# Patient Record
Sex: FEMALE | Race: BLACK OR AFRICAN AMERICAN
[De-identification: names, ages, dates, MRNs, and addresses within clinical notes are randomized per-mention and may not be internally consistent; named-entity substitution may affect disease eponyms.]

---

## 2017-11-19 ENCOUNTER — HOSPITAL ENCOUNTER (EMERGENCY)
Dept: HOSPITAL 27 - EMS | Age: 57
LOS: 1 days | Discharge: HOME | End: 2017-11-20
Payer: MEDICARE

## 2017-11-19 VITALS — DIASTOLIC BLOOD PRESSURE: 86 MMHG | SYSTOLIC BLOOD PRESSURE: 138 MMHG

## 2017-11-19 VITALS — BODY MASS INDEX: 41.06 KG/M2 | WEIGHT: 240.5 LBS | HEIGHT: 64 IN

## 2017-11-19 DIAGNOSIS — F41.9: ICD-10-CM

## 2017-11-19 DIAGNOSIS — E78.00: ICD-10-CM

## 2017-11-19 DIAGNOSIS — E11.9: ICD-10-CM

## 2017-11-19 DIAGNOSIS — Z88.8: ICD-10-CM

## 2017-11-19 DIAGNOSIS — I10: Primary | ICD-10-CM

## 2017-11-19 DIAGNOSIS — K21.9: ICD-10-CM

## 2017-11-19 LAB
ALBUMIN SERPL-MCNC: 3.6 G/DL (ref 3.4–5)
ALT SERPL-CCNC: 13 U/L (ref 12–78)
ANION GAP SERPL CALCULATED.3IONS-SCNC: 10 MMOL/L (ref 8–16)
AST SERPL-CCNC: 17 U/L (ref 15–37)
BASOPHILS # BLD AUTO: 0.1 K/UL (ref 0–0.2)
BASOPHILS NFR BLD AUTO: 0.6 % (ref 0–2)
BILIRUB SERPL-MCNC: 0.2 MG/DL (ref 0.1–1)
BUN SERPL-MCNC: 21 MG/DL (ref 7–18)
CALCIUM SERPL-MCNC: 9.8 MG/DL (ref 8.8–10.5)
CHLORIDE SERPL-SCNC: 102 MMOL/L (ref 98–107)
CO2 SERPL-SCNC: 27 MMOL/L (ref 22–29)
CREAT SERPL-MCNC: 1.32 MG/DL (ref 0.6–1.3)
EOSINOPHIL # BLD AUTO: 0.4 K/UL (ref 0–0.7)
EOSINOPHIL NFR BLD AUTO: 3.8 % (ref 1–6)
ERYTHROCYTE [DISTWIDTH] IN BLOOD BY AUTOMATED COUNT: 14.7 % (ref 11.5–14.5)
GFR SERPL CREATININE-BSD FRML MDRD: 50 ML/MIN (ref 60–?)
GLUCOSE BLDC GLUCOMTR-MCNC: 113 MG/DL (ref 70–110)
HCT VFR BLD AUTO: 38.3 % (ref 36–46)
HGB BLD-MCNC: 12.6 G/DL (ref 12–16)
LYMPHOCYTES # BLD AUTO: 4.2 K/UL (ref 1–4.8)
LYMPHOCYTES NFR BLD AUTO: 43.7 % (ref 22–44)
MCH RBC QN AUTO: 26.8 PG (ref 26–34)
MCHC RBC AUTO-ENTMCNC: 32.9 G/DL (ref 31–37)
MCV RBC AUTO: 81 FL (ref 80–100)
MONOCYTES # BLD AUTO: 1 K/UL (ref 0.1–1)
MONOCYTES NFR BLD AUTO: 10.9 % (ref 2–9)
NEUTROPHILS # BLD AUTO: 3.9 K/UL (ref 1.8–7.7)
NEUTROPHILS NFR BLD AUTO: 41 % (ref 40–70)
PLATELET # BLD AUTO: 272 K/UL (ref 150–450)
POTASSIUM SERPL-SCNC: 3.5 MMOL/L (ref 3.5–5.1)
PROT SERPL-MCNC: 8 G/DL (ref 6.4–8.2)
RBC # BLD AUTO: 4.71 MIL/UL (ref 4–5.2)
SODIUM SERPL-SCNC: 139 MMOL/L (ref 136–145)
WBC # BLD AUTO: 9.5 K/UL (ref 4.5–11)

## 2017-11-19 PROCEDURE — 99285 EMERGENCY DEPT VISIT HI MDM: CPT

## 2017-11-19 PROCEDURE — 93005 ELECTROCARDIOGRAM TRACING: CPT

## 2017-11-19 PROCEDURE — 82962 GLUCOSE BLOOD TEST: CPT

## 2018-03-23 DIAGNOSIS — M25.562 LEFT KNEE PAIN, UNSPECIFIED CHRONICITY: Primary | ICD-10-CM

## 2018-03-27 ENCOUNTER — HOSPITAL ENCOUNTER (OUTPATIENT)
Dept: RADIOLOGY | Facility: HOSPITAL | Age: 58
Discharge: HOME | End: 2018-03-27
Attending: ORTHOPAEDIC SURGERY
Payer: MEDICARE

## 2018-03-27 ENCOUNTER — OFFICE VISIT (OUTPATIENT)
Dept: ORTHOPEDICS | Facility: CLINIC | Age: 58
End: 2018-03-27
Payer: MEDICARE

## 2018-03-27 VITALS
SYSTOLIC BLOOD PRESSURE: 129 MMHG | WEIGHT: 229.94 LBS | BODY MASS INDEX: 40.74 KG/M2 | DIASTOLIC BLOOD PRESSURE: 78 MMHG | HEART RATE: 89 BPM | HEIGHT: 63 IN

## 2018-03-27 DIAGNOSIS — G89.29 CHRONIC MIDLINE LOW BACK PAIN WITH LEFT-SIDED SCIATICA: ICD-10-CM

## 2018-03-27 DIAGNOSIS — M25.562 LEFT KNEE PAIN, UNSPECIFIED CHRONICITY: Primary | ICD-10-CM

## 2018-03-27 DIAGNOSIS — M25.562 LEFT KNEE PAIN, UNSPECIFIED CHRONICITY: ICD-10-CM

## 2018-03-27 DIAGNOSIS — M54.42 CHRONIC MIDLINE LOW BACK PAIN WITH LEFT-SIDED SCIATICA: ICD-10-CM

## 2018-03-27 DIAGNOSIS — M47.816 LUMBAR SPONDYLOSIS: ICD-10-CM

## 2018-03-27 PROCEDURE — 73562 X-RAY EXAM OF KNEE 3: CPT | Mod: TC,FY,PO,RT

## 2018-03-27 PROCEDURE — 73564 X-RAY EXAM KNEE 4 OR MORE: CPT | Mod: 26,LT,, | Performed by: RADIOLOGY

## 2018-03-27 PROCEDURE — 73562 X-RAY EXAM OF KNEE 3: CPT | Mod: 26,59,RT, | Performed by: RADIOLOGY

## 2018-03-27 PROCEDURE — 99205 OFFICE O/P NEW HI 60 MIN: CPT | Mod: S$PBB,,, | Performed by: ORTHOPAEDIC SURGERY

## 2018-03-27 PROCEDURE — 99213 OFFICE O/P EST LOW 20 MIN: CPT | Mod: PBBFAC,25,PO | Performed by: ORTHOPAEDIC SURGERY

## 2018-03-27 PROCEDURE — 99999 PR PBB SHADOW E&M-EST. PATIENT-LVL III: CPT | Mod: PBBFAC,,, | Performed by: ORTHOPAEDIC SURGERY

## 2018-03-27 NOTE — PATIENT INSTRUCTIONS
Arthralgia    Arthralgia is the term for pain in or around the joint. It is a symptom, not a disease. This pain may involve one or more joints. In some cases, the pain moves from joint to joint.  There are many causes for joint pain. These include:  · Injury  · Osteoarthritis (wearing out of the joint surface)  · Gout (inflammation of the joint due to crystals in the joint fluid)  · Infection inside the joint    · Bursitis (inflammation of the fluid-filled sacs around the joint)  · Autoimmune disorders such as rheumatoid arthritis or lupus  · Tendonitis (inflammation of chords that attach muscle to bone)  Home care  · Rest the involved joint(s) until your symptoms improve.   · You may be prescribed pain medicine. If none is prescribed, you may use acetaminophen or ibuprofen to control pain and inflammation.  Follow-up care  Follow up with your healthcare provider or as advised.  When to seek medical advice  Contact your healthcare provider right away if any of the following occurs:  · Pain, swelling, or redness of joint increases  · Pain worsens or recurs after a period of improvement  · Pain moves to other joints  · You cannot bear weight on the affected joint   · You cannot move the affected joint  · Joint appears deformed  · New rash appears  · Fever of 100.4ºF (38ºC) or higher, or as directed by your healthcare provider  Date Last Reviewed: 3/1/2017  © 0423-0833 [employer]. [address] [place] [address], [place] [address]. All rights reserved. This information is not intended as a substitute for professional medical care. Always follow your healthcare professional's instructions.

## 2018-03-27 NOTE — PROGRESS NOTES
CC: This is a 57-year-old female that complains of left knee pain.    HPI: The patient states that she underwent a left total knee replacement in [facility].  She states that postoperatively she did well for short.  Then felt a pop and had some swelling in the left knee.  She states that she underwent a bone scan which showed sign of infection but a questionable loosening.  The patient denies any fever or chills.  The patient states that the pain radiates from the knee to the lower extremity as well.    PMH:    Past Medical History:   Diagnosis Date    Breast cancer 2003    Diabetes 11-6-14    Hypertension        PSH:    Past Surgical History:   Procedure Laterality Date    KNEE ARTHROPLASTY Left 2016    KNEE ARTHROSCOPY Left     two knee scopes, date unknown        Family Hx:  History reviewed. No pertinent family history.    Allergy:    Review of patient's allergies indicates:   Allergen Reactions    Lotrel [amlodipine-benazepril] Swelling     Tongue swells     Shellfish containing products Shortness Of Breath and Swelling       Medication:    Current Outpatient Prescriptions:     amlodipine (NORVASC) 10 MG tablet, Take 10 mg by mouth once daily., Disp: , Rfl:     aspirin 81 MG Chew, Take 81 mg by mouth once daily., Disp: , Rfl:     bumetanide (BUMEX) 0.5 MG Tab, Take 1 mg by mouth once daily., Disp: , Rfl:     clonazePAM (KLONOPIN) 1 MG tablet, Take 1 mg by mouth once daily., Disp: , Rfl:     hydrALAZINE (APRESOLINE) 25 MG tablet, Take 25 mg by mouth 2 (two) times daily., Disp: , Rfl:     metformin (GLUCOPHAGE) 500 MG tablet, Take 500 mg by mouth once daily., Disp: , Rfl:     metoprolol succinate (TOPROL-XL) 50 MG 24 hr tablet, Take 50 mg by mouth 2 (two) times daily., Disp: , Rfl:     omeprazole (PRILOSEC) 20 MG capsule, Take 20 mg by mouth once daily., Disp: , Rfl:     potassium chloride (KLOR-CON) 8 MEQ TbSR, Take 8 mEq by mouth once daily., Disp: , Rfl:     pravastatin (PRAVACHOL)  "20 MG tablet, Take 20 mg by mouth once daily., Disp: , Rfl:     valsartan (DIOVAN) 320 MG tablet, Take 320 mg by mouth once daily., Disp: , Rfl:     Social History:    Social History     Social History    Marital status:      Spouse name: N/A    Number of children: N/A    Years of education: N/A     Occupational History    Not on file.     Social History Main Topics    Smoking status: Never Smoker    Smokeless tobacco: Never Used    Alcohol use No    Drug use: No    Sexual activity: Not on file     Other Topics Concern    Not on file     Social History Narrative    No narrative on file       Vitals:   /78   Pulse 89   Ht 5' 3" (1.6 m)   Wt 104.3 kg (229 lb 15 oz)   BMI 40.73 kg/m²      ROS:  GENERAL: No fever, chills, fatigability or weight loss.  SKIN: No rashes, itching or changes in color or texture of skin.  HEAD: No headaches or recent head trauma.  EYES: Visual acuity fine. No photophobia, ocular pain or diplopia.  EARS: Denies ear pain, discharge or vertigo.  NOSE: No loss of smell, no epistaxis or postnasal drip.  MOUTH & THROAT: No hoarseness or change in voice. No excessive gum bleeding.  NODES: Denies swollen glands.  CHEST: Denies GARCIA, cyanosis, wheezing, cough and sputum production.  CARDIOVASCULAR: Denies chest pain, PND, orthopnea or reduced exercise tolerance.  ABDOMEN: Appetite fine. No weight loss. Denies diarrhea, abdominal pain, hematemesis or blood in stool.  URINARY: No flank pain, dysuria or hematuria.  PERIPHERAL VASCULAR: No claudication or cyanosis.  NEUROLOGIC: No history of seizures, paralysis, alteration of gait or coordination.  MUSCULOSKELETAL: See HPI    PE:  APPEARANCE: Well nourished, well developed, in no acute distress.   HEAD: Normocephalic, atraumatic.  EYES: PERRL. EOMI.   EARS: TM's intact. Light reflex normal. No retraction or perforation.   NOSE: Mucosa pink. Airway clear.  MOUTH & THROAT: No tonsillar enlargement. No pharyngeal erythema or " exudate. No stridor.  NECK: Supple.   NODES: No cervical, axillary or inguinal lymph node enlargement.  CHEST: Lungs clear to auscultation.  CARDIOVASCULAR: Normal S1, S2. No rubs, murmurs or gallops.  ABDOMEN: Bowel sounds normal. Not distended. Soft. No tenderness or masses.  NEUROLOGIC: Cranial Nerves: II-XII grossly intact, also see MUSCULOSKELETAL  MUSCULOSKELETAL:        Lumbar spine-decreased range of motion in all planes.  No spasms noted, symmetrical deep tendon reflexes motor strength examination and sensory examination, negative long tract sign.       Left Knee  -dressing intact, 2 plus dorsalis pedis and posterior tibial artery pulses, light touch intact Left lower extremity.  All digits are warm. No erythema, no warmth, no drainage, no swelling, no significant tenderness.  Less than 2 seconds capillary refill all digits.  Range of motion is 0-95°           Assessment:  The x-ray shows the patient's prosthesis is intact.           Diagnosis:              1.  Left knee pain               2.  Lumbar spondylosis    Diagnostic Studies  MRI-No  X-Ray-No  EMG/NCV-No  Arthrogram-No  Bone Scan-No  CT Scan-No  Doppler-No  ESR-Yes  CRP-No  CBC with Diff-No   Rheumatoid/Arthritis Panel-No      Plan:                                                 1. PT-yes                                                 2.OT-no                                          3.NSAID-yes                                        4. Narcotics-no                                     5. Wound care-No                                 6. Rest-yes                                           7. Surgery-no                                         8. JAYCE Hose-no                                    9. Anticoagulation therapy-no               10. Elevation-no                                     11. Crutches-no                                    12. Walker-no             13. Cane no                        14. Referral-yes, to a physiatrist or a spine surgeon.                                      15.Injection-no                            16. Splint   /    Cast   /   Cast Shoe-Yes              17. RICE-none            18. Follow up-

## 2018-03-28 ENCOUNTER — TELEPHONE (OUTPATIENT)
Dept: PAIN MEDICINE | Facility: CLINIC | Age: 58
End: 2018-03-28

## 2018-03-28 NOTE — TELEPHONE ENCOUNTER
----- Message from Leah Curran sent at 3/28/2018 11:49 AM CDT -----  Contact: patient  Calling to reschedule her appointment sooner than 05/06/18. Please call patient @ [phone]. Thanks, fareed

## 2018-04-17 ENCOUNTER — TELEPHONE (OUTPATIENT)
Dept: RADIOLOGY | Facility: HOSPITAL | Age: 58
End: 2018-04-17

## 2018-05-03 ENCOUNTER — OFFICE VISIT (OUTPATIENT)
Dept: ORTHOPEDICS | Facility: CLINIC | Age: 58
End: 2018-05-03
Payer: MEDICARE

## 2018-05-03 ENCOUNTER — HOSPITAL ENCOUNTER (OUTPATIENT)
Dept: RADIOLOGY | Facility: HOSPITAL | Age: 58
Discharge: HOME | End: 2018-05-03
Attending: ORTHOPAEDIC SURGERY
Payer: MEDICARE

## 2018-05-03 VITALS — HEIGHT: 63 IN | BODY MASS INDEX: 44.83 KG/M2 | WEIGHT: 253 LBS

## 2018-05-03 DIAGNOSIS — M47.816 LUMBAR SPONDYLOSIS: ICD-10-CM

## 2018-05-03 DIAGNOSIS — M79.605 LEFT LEG PAIN: ICD-10-CM

## 2018-05-03 DIAGNOSIS — B99.9 INFECTION: Primary | ICD-10-CM

## 2018-05-03 DIAGNOSIS — M79.605 LEFT LEG PAIN: Primary | ICD-10-CM

## 2018-05-03 DIAGNOSIS — M54.5 ACUTE MIDLINE LOW BACK PAIN, WITH SCIATICA PRESENCE UNSPECIFIED: ICD-10-CM

## 2018-05-03 PROCEDURE — 93971 EXTREMITY STUDY: CPT | Mod: 26,,, | Performed by: RADIOLOGY

## 2018-05-03 PROCEDURE — 99215 OFFICE O/P EST HI 40 MIN: CPT | Mod: S$PBB,,, | Performed by: ORTHOPAEDIC SURGERY

## 2018-05-03 PROCEDURE — 93971 EXTREMITY STUDY: CPT | Mod: TC,PO

## 2018-05-03 PROCEDURE — 99213 OFFICE O/P EST LOW 20 MIN: CPT | Mod: PBBFAC,25,PO | Performed by: ORTHOPAEDIC SURGERY

## 2018-05-03 PROCEDURE — 99999 PR PBB SHADOW E&M-EST. PATIENT-LVL III: CPT | Mod: PBBFAC,,, | Performed by: ORTHOPAEDIC SURGERY

## 2018-05-03 NOTE — PROGRESS NOTES
CC: This is a 57-year-old female that complains of left knee pain.    HPI: The patient states that she underwent a left total knee replacement in [facility].  She states that postoperatively she did well for short.  Then felt a pop and had some swelling in the left knee.  She states that she underwent a bone scan which showed sign of infection but a questionable loosening.  The patient denies any fever or chills.  The patient states that the pain radiates from the knee to the lower extremity as well.    PMH:    Past Medical History:   Diagnosis Date    Breast cancer 2003    Diabetes 11-6-14    Hypertension        PSH:    Past Surgical History:   Procedure Laterality Date    KNEE ARTHROPLASTY Left 2016    KNEE ARTHROSCOPY Left     two knee scopes, date unknown        Family Hx:  History reviewed. No pertinent family history.    Allergy:    Review of patient's allergies indicates:   Allergen Reactions    Lotrel [amlodipine-benazepril] Swelling     Tongue swells     Shellfish containing products Shortness Of Breath and Swelling       Medication:    Current Outpatient Prescriptions:     amlodipine (NORVASC) 10 MG tablet, Take 10 mg by mouth once daily., Disp: , Rfl:     aspirin 81 MG Chew, Take 81 mg by mouth once daily., Disp: , Rfl:     bumetanide (BUMEX) 0.5 MG Tab, Take 1 mg by mouth once daily., Disp: , Rfl:     clonazePAM (KLONOPIN) 1 MG tablet, Take 1 mg by mouth once daily., Disp: , Rfl:     hydrALAZINE (APRESOLINE) 25 MG tablet, Take 25 mg by mouth 2 (two) times daily., Disp: , Rfl:     metformin (GLUCOPHAGE) 500 MG tablet, Take 500 mg by mouth once daily., Disp: , Rfl:     metoprolol succinate (TOPROL-XL) 50 MG 24 hr tablet, Take 50 mg by mouth 2 (two) times daily., Disp: , Rfl:     omeprazole (PRILOSEC) 20 MG capsule, Take 20 mg by mouth once daily., Disp: , Rfl:     potassium chloride (KLOR-CON) 8 MEQ TbSR, Take 8 mEq by mouth once daily., Disp: , Rfl:     pravastatin (PRAVACHOL)  "20 MG tablet, Take 20 mg by mouth once daily., Disp: , Rfl:     valsartan (DIOVAN) 320 MG tablet, Take 320 mg by mouth once daily., Disp: , Rfl:     Social History:    Social History     Social History    Marital status:      Spouse name: N/A    Number of children: N/A    Years of education: N/A     Occupational History    Not on file.     Social History Main Topics    Smoking status: Never Smoker    Smokeless tobacco: Never Used    Alcohol use No    Drug use: No    Sexual activity: Not on file     Other Topics Concern    Not on file     Social History Narrative    No narrative on file       Vitals:   Ht 5' 3" (1.6 m)   Wt 114.8 kg (253 lb)   BMI 44.82 kg/m²      ROS:  GENERAL: No fever, chills, fatigability or weight loss.  SKIN: No rashes, itching or changes in color or texture of skin.  HEAD: No headaches or recent head trauma.  EYES: Visual acuity fine. No photophobia, ocular pain or diplopia.  EARS: Denies ear pain, discharge or vertigo.  NOSE: No loss of smell, no epistaxis or postnasal drip.  MOUTH & THROAT: No hoarseness or change in voice. No excessive gum bleeding.  NODES: Denies swollen glands.  CHEST: Denies GARCIA, cyanosis, wheezing, cough and sputum production.  CARDIOVASCULAR: Denies chest pain, PND, orthopnea or reduced exercise tolerance.  ABDOMEN: Appetite fine. No weight loss. Denies diarrhea, abdominal pain, hematemesis or blood in stool.  URINARY: No flank pain, dysuria or hematuria.  PERIPHERAL VASCULAR: No claudication or cyanosis.  NEUROLOGIC: No history of seizures, paralysis, alteration of gait or coordination.  MUSCULOSKELETAL: See HPI    PE:  APPEARANCE: Well nourished, well developed, in no acute distress.   HEAD: Normocephalic, atraumatic.  EYES: PERRL. EOMI.   EARS: TM's intact. Light reflex normal. No retraction or perforation.   NOSE: Mucosa pink. Airway clear.  MOUTH & THROAT: No tonsillar enlargement. No pharyngeal erythema or exudate. No stridor.  NECK: Supple. "   NODES: No cervical, axillary or inguinal lymph node enlargement.  CHEST: Lungs clear to auscultation.  CARDIOVASCULAR: Normal S1, S2. No rubs, murmurs or gallops.  ABDOMEN: Bowel sounds normal. Not distended. Soft. No tenderness or masses.  NEUROLOGIC: Cranial Nerves: II-XII grossly intact, also see MUSCULOSKELETAL  MUSCULOSKELETAL:        Lumbar spine-decreased range of motion in all planes.  No spasms noted, symmetrical deep tendon reflexes motor strength examination and sensory examination, negative long tract sign.       Left Knee  -   2 plus dorsalis pedis and posterior tibial artery pulses, light touch intact Left lower extremity.  All digits are warm. No erythema, no warmth, no drainage, no swelling, no significant tenderness.  Less than 2 seconds capillary refill all digits.  Range of motion is 0-95°           Assessment:  The x-ray shows the patient's prosthesis is intact.           Diagnosis:              1.  Left knee pain               2.  Lumbar spondylosis    Diagnostic Studies  MRI-No  X-Ray-No  EMG/NCV-No  Arthrogram-No  Bone Scan-indium and sulfur colloid scan and ultrasound to rule out DVT  CT Scan-No  Doppler-No  ESR-Yes  CRP-yes  CBC with Diff-yes   Rheumatoid/Arthritis Panel-No      Plan:                                                 1. PT-yes                                                 2.OT-no                                          3.NSAID-yes                                        4. Narcotics-no                                     5. Wound care-No                                 6. Rest-yes                                           7. Surgery-no                                         8. JAYCE Hose-no                                    9. Anticoagulation therapy-no               10. Elevation-no                                     11. Crutches-no                                    12. Walker-no             13. Cane no                        14. Referral-yes, to a physiatrist or a  spine surgeon.                                     15.Injection-no                            16. Splint   /    Cast   /   Cast Shoe-Yes              17. RICE-none            18. Follow up- 3 weeks

## 2018-05-04 PROBLEM — M79.605 LEFT LEG PAIN: Status: ACTIVE | Noted: 2018-05-04

## 2018-05-04 NOTE — PATIENT INSTRUCTIONS
ACE Wrap  Minor muscle or joint injuries are often treated with an elastic bandage. The bandage provides support and compression to the injured area. An elastic bandage is a stretchy, rolled bandage. Elastic bandages range in width from 2 to 6 inches. They can be used for a variety of injuries. The bandages are often called ACE bandages, after the most common brand name.  If used correctly, elastic bandages help control swelling and ease pain. An elastic bandage is also a good reminder not to overuse the injured area. However, elastic bandages do not provide a lot of support and will not prevent reinjury.  Home care    To apply an elastic bandage:  · Check the skin before wrapping the injury. It should be clean, dry, and free of drainage.  · Start wrapping below the injury and work your way toward the body. For an ankle sprain, start wrapping around the foot and work up toward the calf. This will help control swelling.  · Overlap the edges of the bandage so it stays snuggly in place.  · Wrap the bandage firmly, but not too tightly. A tight bandage can increase swelling on either end of the bandage. Make sure the bandage is wrinkle free.  · Leave fingers and toes exposed.  · Secure ends of the bandage (even self-sticking ones) with clips or tape.  · Check frequently to ensure adequate circulation, especially in the fingers and toes. Loosen the bandage if there is local swelling, numbness, tingling, discomfort, coldness, or discoloration (skin pale or bluish in color).  · Rewrap the bandage as needed during the day. Reroll the bandage as you unwind it.  Continue using the elastic bandage until the pain and swelling are gone or as your healthcare provider advises.  If you have been told to ice the area, the ice can be secured in place with the elastic bandage. Wrap the ice pack with a thin towel to protect the skin. Do not put ice or an ice pack directly on the skin.  Ice the area for no more than 20 minutes at a  time.    Follow-up care  Follow up with your healthcare provider, as advised.  When to seek medical advice  Call your healthcare provider for any of the following:  · Pain and swelling that doesn't get better or gets worse  · Trouble moving injured area  · Skin discoloration, numbness, or tingling that doesnt go away after bandage is removed  Date Last Reviewed: 9/13/2015  © 7629-8771 [employer]. [address] [place] [address], [place] [address]. All rights reserved. This information is not intended as a substitute for professional medical care. Always follow your healthcare professional's instructions.

## 2018-05-17 ENCOUNTER — HOSPITAL ENCOUNTER (OUTPATIENT)
Dept: RADIOLOGY | Facility: HOSPITAL | Age: 58
Discharge: HOME | End: 2018-05-17
Attending: ORTHOPAEDIC SURGERY
Payer: MEDICARE

## 2018-05-17 DIAGNOSIS — B99.9 INFECTION: ICD-10-CM

## 2018-05-17 DIAGNOSIS — B99.9 INFECTION: Primary | ICD-10-CM

## 2018-05-17 PROCEDURE — 78805 NM INFLAMMATORY LOCALIZATION LTD CERETEC: CPT | Mod: TC,PO

## 2018-05-17 PROCEDURE — 78805 NM INFLAMMATORY LOCALIZATION LTD CERETEC: CPT | Mod: 26,,, | Performed by: RADIOLOGY

## 2018-06-07 ENCOUNTER — OFFICE VISIT (OUTPATIENT)
Dept: ORTHOPEDICS | Facility: CLINIC | Age: 58
End: 2018-06-07
Payer: MEDICARE

## 2018-06-07 VITALS
BODY MASS INDEX: 44.83 KG/M2 | WEIGHT: 253 LBS | SYSTOLIC BLOOD PRESSURE: 121 MMHG | DIASTOLIC BLOOD PRESSURE: 84 MMHG | HEIGHT: 63 IN | HEART RATE: 79 BPM

## 2018-06-07 DIAGNOSIS — G89.29 CHRONIC MIDLINE LOW BACK PAIN WITH SCIATICA, SCIATICA LATERALITY UNSPECIFIED: ICD-10-CM

## 2018-06-07 DIAGNOSIS — M25.561 RIGHT KNEE PAIN, UNSPECIFIED CHRONICITY: ICD-10-CM

## 2018-06-07 DIAGNOSIS — M25.562 CHRONIC PAIN OF LEFT KNEE: Primary | ICD-10-CM

## 2018-06-07 DIAGNOSIS — G89.29 CHRONIC PAIN OF LEFT KNEE: Primary | ICD-10-CM

## 2018-06-07 DIAGNOSIS — M54.40 CHRONIC MIDLINE LOW BACK PAIN WITH SCIATICA, SCIATICA LATERALITY UNSPECIFIED: ICD-10-CM

## 2018-06-07 PROCEDURE — 99214 OFFICE O/P EST MOD 30 MIN: CPT | Mod: PBBFAC,PO | Performed by: ORTHOPAEDIC SURGERY

## 2018-06-07 PROCEDURE — 99215 OFFICE O/P EST HI 40 MIN: CPT | Mod: S$PBB,,, | Performed by: ORTHOPAEDIC SURGERY

## 2018-06-07 PROCEDURE — 99999 PR PBB SHADOW E&M-EST. PATIENT-LVL IV: CPT | Mod: PBBFAC,,, | Performed by: ORTHOPAEDIC SURGERY

## 2018-06-07 NOTE — PATIENT INSTRUCTIONS
Arthralgia    Arthralgia is the term for pain in or around the joint. It is a symptom, not a disease. This pain may involve one or more joints. In some cases, the pain moves from joint to joint.  There are many causes for joint pain. These include:  · Injury  · Osteoarthritis (wearing out of the joint surface)  · Gout (inflammation of the joint due to crystals in the joint fluid)  · Infection inside the joint    · Bursitis (inflammation of the fluid-filled sacs around the joint)  · Autoimmune disorders such as rheumatoid arthritis or lupus  · Tendonitis (inflammation of chords that attach muscle to bone)  Home care  · Rest the involved joint(s) until your symptoms improve.   · You may be prescribed pain medicine. If none is prescribed, you may use acetaminophen or ibuprofen to control pain and inflammation.  Follow-up care  Follow up with your healthcare provider or as advised.  When to seek medical advice  Contact your healthcare provider right away if any of the following occurs:  · Pain, swelling, or redness of joint increases  · Pain worsens or recurs after a period of improvement  · Pain moves to other joints  · You cannot bear weight on the affected joint   · You cannot move the affected joint  · Joint appears deformed  · New rash appears  · Fever of 100.4ºF (38ºC) or higher, or as directed by your healthcare provider  Date Last Reviewed: 3/1/2017  © 4946-2227 [employer]. [address] [place] [address], [place] [address]. All rights reserved. This information is not intended as a substitute for professional medical care. Always follow your healthcare professional's instructions.

## 2018-06-07 NOTE — PROGRESS NOTES
CC: This is a 57-year-old female that complains of left knee pain.    HPI: The patient states that she underwent a left total knee replacement in [facility].  She states that postoperatively she did well for short.  Then felt a pop and had some swelling in the left knee.  She states that she underwent a bone scan which showed sign of infection but a questionable loosening.  The patient denies any fever or chills.  The patient states that the pain radiates from the knee to the lower extremity as well.    PMH:    Past Medical History:   Diagnosis Date    Breast cancer 2003    Diabetes 11-6-14    Hypertension        PSH:    Past Surgical History:   Procedure Laterality Date    KNEE ARTHROPLASTY Left 2016    KNEE ARTHROSCOPY Left     two knee scopes, date unknown        Family Hx:  History reviewed. No pertinent family history.    Allergy:    Review of patient's allergies indicates:   Allergen Reactions    Lotrel [amlodipine-benazepril] Swelling     Tongue swells     Shellfish containing products Shortness Of Breath and Swelling       Medication:    Current Outpatient Prescriptions:     amlodipine (NORVASC) 10 MG tablet, Take 10 mg by mouth once daily., Disp: , Rfl:     aspirin 81 MG Chew, Take 81 mg by mouth once daily., Disp: , Rfl:     bumetanide (BUMEX) 0.5 MG Tab, Take 1 mg by mouth once daily., Disp: , Rfl:     clonazePAM (KLONOPIN) 1 MG tablet, Take 1 mg by mouth once daily., Disp: , Rfl:     hydrALAZINE (APRESOLINE) 25 MG tablet, Take 25 mg by mouth 2 (two) times daily., Disp: , Rfl:     metoprolol succinate (TOPROL-XL) 50 MG 24 hr tablet, Take 50 mg by mouth 2 (two) times daily., Disp: , Rfl:     omeprazole (PRILOSEC) 20 MG capsule, Take 20 mg by mouth once daily., Disp: , Rfl:     potassium chloride (KLOR-CON) 8 MEQ TbSR, Take 8 mEq by mouth once daily., Disp: , Rfl:     pravastatin (PRAVACHOL) 20 MG tablet, Take 20 mg by mouth once daily., Disp: , Rfl:     valsartan (DIOVAN) 320 MG  "tablet, Take 320 mg by mouth once daily., Disp: , Rfl:     metformin (GLUCOPHAGE) 500 MG tablet, Take 500 mg by mouth once daily., Disp: , Rfl:     Social History:    Social History     Social History    Marital status:      Spouse name: N/A    Number of children: N/A    Years of education: N/A     Occupational History    Not on file.     Social History Main Topics    Smoking status: Never Smoker    Smokeless tobacco: Never Used    Alcohol use No    Drug use: No    Sexual activity: Not on file     Other Topics Concern    Not on file     Social History Narrative    No narrative on file       Vitals:   /84 (BP Location: Left arm, Patient Position: Sitting, BP Method: Medium (Automatic))   Pulse 79   Ht 5' 3" (1.6 m)   Wt 114.8 kg (253 lb)   BMI 44.82 kg/m²      ROS:  GENERAL: No fever, chills, fatigability or weight loss.  SKIN: No rashes, itching or changes in color or texture of skin.  HEAD: No headaches or recent head trauma.  EYES: Visual acuity fine. No photophobia, ocular pain or diplopia.  EARS: Denies ear pain, discharge or vertigo.  NOSE: No loss of smell, no epistaxis or postnasal drip.  MOUTH & THROAT: No hoarseness or change in voice. No excessive gum bleeding.  NODES: Denies swollen glands.  CHEST: Denies GARCIA, cyanosis, wheezing, cough and sputum production.  CARDIOVASCULAR: Denies chest pain, PND, orthopnea or reduced exercise tolerance.  ABDOMEN: Appetite fine. No weight loss. Denies diarrhea, abdominal pain, hematemesis or blood in stool.  URINARY: No flank pain, dysuria or hematuria.  PERIPHERAL VASCULAR: No claudication or cyanosis.  NEUROLOGIC: No history of seizures, paralysis, alteration of gait or coordination.  MUSCULOSKELETAL: See HPI    PE:  APPEARANCE: Well nourished, well developed, in no acute distress.   HEAD: Normocephalic, atraumatic.  EYES: PERRL. EOMI.   EARS: TM's intact. Light reflex normal. No retraction or perforation.   NOSE: Mucosa pink. Airway " clear.  MOUTH & THROAT: No tonsillar enlargement. No pharyngeal erythema or exudate. No stridor.  NECK: Supple.   NODES: No cervical, axillary or inguinal lymph node enlargement.  CHEST: Lungs clear to auscultation.  CARDIOVASCULAR: Normal S1, S2. No rubs, murmurs or gallops.  ABDOMEN: Bowel sounds normal. Not distended. Soft. No tenderness or masses.  NEUROLOGIC: Cranial Nerves: II-XII grossly intact, also see MUSCULOSKELETAL  MUSCULOSKELETAL:        Lumbar spine-decreased range of motion in all planes.  No spasms noted, symmetrical deep tendon reflexes motor strength examination and sensory examination, negative long tract sign.       Left Knee  -   2 plus dorsalis pedis and posterior tibial artery pulses, light touch intact Left lower extremity.  All digits are warm. No erythema, no warmth, no drainage, no swelling, no significant tenderness.  Less than 2 seconds capillary refill all digits.  Range of motion is 0-95°           Assessment:  The x-ray shows the patient's prosthesis is intact.  I have discussed the patient's findings on the indium scan with her.  She understands that the recurrence swelling could be due to her arthritis.  The patient does states that she has been diagnosed with rheumatoid arthritis.  She understands that a recurring synovitis can cause swelling of the knee.           Diagnosis:              1.  Left knee pain               2.  Lumbar spondylosis    Diagnostic Studies  MRI-No  X-Ray-No  EMG/NCV-No  Arthrogram-No  Bone Scan- I have discussed the results of the Indium scan  CT Scan-No  Doppler-No  ESR-Yes  CRP-yes  CBC with Diff-yes   Rheumatoid/Arthritis Panel-No      Plan:                                                 1. PT-yes                                                 2.OT-no                                          3.NSAID-yes                                        4. Narcotics-no                                     5. Wound care-No                                 6.  Rest-yes                                           7. Surgery-no                                         8. JAYCE Hose-no                                    9. Anticoagulation therapy-no               10. Elevation-no                                     11. Crutches-no                                    12. Walker-no             13. Cane no                        14. Referral-yes, to a physiatrist or a spine surgeon.                                     15.Injection-no                            16. Splint   /    Cast   /   Cast Shoe-Yes              17. RICE-none            18. Follow up-6 months.

## 2018-06-14 ENCOUNTER — TELEPHONE (OUTPATIENT)
Dept: ORTHOPEDICS | Facility: CLINIC | Age: 58
End: 2018-06-14

## 2018-06-14 NOTE — TELEPHONE ENCOUNTER
Called the patient regarding her script. Left a message for the patient to give the office a call back when she gets the message.FP      ----- Message from Lauren Cuello sent at 6/14/2018  9:34 AM CDT -----  Contact: pt   Call pt regarding a script that was suppose to be sent to the pharmacy.   [phone]

## 2018-06-18 ENCOUNTER — TELEPHONE (OUTPATIENT)
Dept: ORTHOPEDICS | Facility: CLINIC | Age: 58
End: 2018-06-18

## 2018-06-18 NOTE — TELEPHONE ENCOUNTER
Spoke with the patient about her knee brace. Patient wanted to make sure that she had the right time and place to get fitted for her knee brace. I informed the patient that she would have to call the company to conform the time and date.FP        ----- Message from Dominic Casey sent at 6/18/2018 10:30 AM CDT -----  Contact: pt   Pt calling in reg to getting fit with a knee brace on 6/22         ..[phone] (home) [phone] (work)

## 2018-08-31 ENCOUNTER — TELEPHONE (OUTPATIENT)
Dept: PAIN MEDICINE | Facility: CLINIC | Age: 58
End: 2018-08-31

## 2018-08-31 NOTE — TELEPHONE ENCOUNTER
----- Message from Lauren Cuello sent at 8/31/2018 10:23 AM CDT -----  Contact: pt   Pt was returning nurse call     [phone]

## 2018-08-31 NOTE — TELEPHONE ENCOUNTER
left message on voicemail to call back at earliest convenience to reschedule to earlier appt if pt would like.//lp

## 2018-08-31 NOTE — TELEPHONE ENCOUNTER
Contacted pt in attempt to offer earlier appt. Pt declined and would like to keep dec appt. All questions answered.//lp

## 2018-09-06 ENCOUNTER — TELEPHONE (OUTPATIENT)
Dept: ORTHOPEDICS | Facility: CLINIC | Age: 58
End: 2018-09-06

## 2018-09-06 NOTE — TELEPHONE ENCOUNTER
Last office note has been faxed.FP          ----- Message from Miguel A Bonilla sent at 9/6/2018 11:09 AM CDT -----  Contact: Marjorie Collado [facility]-[phone]  Would like last consult notes , please fax notes to [phone].  Please call back at [phone].  x-

## 2018-09-10 ENCOUNTER — TELEPHONE (OUTPATIENT)
Dept: ORTHOPEDICS | Facility: CLINIC | Age: 58
End: 2018-09-10

## 2018-09-10 NOTE — TELEPHONE ENCOUNTER
Called the patient about a knee brace. Patient was not available. Could not leave a message.FP          ----- Message from Kelly Stokes sent at 9/10/2018  9:57 AM CDT -----  Patient needs call back rg knee brace referral.. [phone] (work)

## 2018-09-17 ENCOUNTER — TELEPHONE (OUTPATIENT)
Dept: ORTHOPEDICS | Facility: CLINIC | Age: 58
End: 2018-09-17

## 2018-09-17 NOTE — TELEPHONE ENCOUNTER
Requested to leave a message about the custom brace.  The patient referral was placed on 6/7/18. The patient was explained that [facility] is our third party company whom we send our orders to for custom braces.   She explained her frustration about not being contacted back quickly enough about her brace or the staff is always bust at [facility] and doesn't return her call.s he knee is swollen and getting bigger.    I explained that I would give [facility] a call to see whats the hold up. After calling, ashley been placed on hold 3 times.   After speaking with someone.  Ronnie stated anyday now the patient is in the process of movong and wants it ordered. I explained [facility] will contact her once its in

## 2018-10-02 ENCOUNTER — HOSPITAL ENCOUNTER (EMERGENCY)
Dept: HOSPITAL 27 - EMS | Age: 58
Discharge: HOME | End: 2018-10-02
Payer: MEDICARE

## 2018-10-02 VITALS — WEIGHT: 252.54 LBS | BODY MASS INDEX: 44.75 KG/M2 | HEIGHT: 63 IN

## 2018-10-02 VITALS — DIASTOLIC BLOOD PRESSURE: 77 MMHG | SYSTOLIC BLOOD PRESSURE: 132 MMHG

## 2018-10-02 DIAGNOSIS — Z79.84: ICD-10-CM

## 2018-10-02 DIAGNOSIS — K21.9: ICD-10-CM

## 2018-10-02 DIAGNOSIS — Z90.12: ICD-10-CM

## 2018-10-02 DIAGNOSIS — I10: ICD-10-CM

## 2018-10-02 DIAGNOSIS — Z88.8: ICD-10-CM

## 2018-10-02 DIAGNOSIS — Z79.82: ICD-10-CM

## 2018-10-02 DIAGNOSIS — E78.00: ICD-10-CM

## 2018-10-02 DIAGNOSIS — E11.9: ICD-10-CM

## 2018-10-02 DIAGNOSIS — H57.89: Primary | ICD-10-CM

## 2018-10-02 DIAGNOSIS — Z77.098: ICD-10-CM

## 2018-10-02 LAB — GLUCOSE BLDC GLUCOMTR-MCNC: 98 MG/DL (ref 70–110)

## 2018-10-02 PROCEDURE — 99283 EMERGENCY DEPT VISIT LOW MDM: CPT

## 2018-10-29 ENCOUNTER — TELEPHONE (OUTPATIENT)
Dept: ORTHOPEDICS | Facility: CLINIC | Age: 58
End: 2018-10-29

## 2018-10-29 NOTE — TELEPHONE ENCOUNTER
Called to inform patient that  will be changing to our [place] location starting 12/3 and that she will need to go to this location for her f/u appt on 12/20. Verbalized understanding

## 2019-04-02 ENCOUNTER — HOSPITAL ENCOUNTER (EMERGENCY)
Dept: HOSPITAL 27 - EMS | Age: 59
Discharge: HOME | End: 2019-04-02
Payer: MEDICARE

## 2019-04-02 VITALS — WEIGHT: 260.54 LBS | BODY MASS INDEX: 46.16 KG/M2 | HEIGHT: 63 IN

## 2019-04-02 VITALS — SYSTOLIC BLOOD PRESSURE: 135 MMHG | DIASTOLIC BLOOD PRESSURE: 90 MMHG

## 2019-04-02 DIAGNOSIS — I10: ICD-10-CM

## 2019-04-02 DIAGNOSIS — R07.89: Primary | ICD-10-CM

## 2019-04-02 DIAGNOSIS — K21.9: ICD-10-CM

## 2019-04-02 DIAGNOSIS — Z79.82: ICD-10-CM

## 2019-04-02 DIAGNOSIS — E11.9: ICD-10-CM

## 2019-04-02 DIAGNOSIS — E78.00: ICD-10-CM

## 2019-04-02 DIAGNOSIS — Z88.8: ICD-10-CM

## 2019-04-02 DIAGNOSIS — Z90.12: ICD-10-CM

## 2019-04-02 LAB
ALBUMIN SERPL-MCNC: 3.4 G/DL (ref 3.4–5)
ALP SERPL-CCNC: 120 U/L (ref 46–116)
ALT SERPL-CCNC: 15 U/L (ref 12–78)
ANION GAP SERPL CALCULATED.3IONS-SCNC: 10 MMOL/L (ref 8–16)
AST SERPL-CCNC: 20 U/L (ref 15–37)
BASOPHILS # BLD AUTO: 0.1 K/UL (ref 0–0.2)
BASOPHILS NFR BLD AUTO: 1 % (ref 0–2)
BILIRUB SERPL-MCNC: 0.3 MG/DL (ref 0.1–1)
BUN SERPL-MCNC: 17 MG/DL (ref 7–18)
CALCIUM SERPL-MCNC: 9.3 MG/DL (ref 8.8–10.5)
CHLORIDE SERPL-SCNC: 103 MMOL/L (ref 98–107)
CO2 SERPL-SCNC: 29 MMOL/L (ref 22–29)
CREAT SERPL-MCNC: 1 MG/DL (ref 0.6–1.3)
EOSINOPHIL # BLD AUTO: 0.2 K/UL (ref 0–0.7)
EOSINOPHIL NFR BLD AUTO: 2.7 % (ref 1–6)
ERYTHROCYTE [DISTWIDTH] IN BLOOD BY AUTOMATED COUNT: 14.4 % (ref 11.5–14.5)
GFR SERPL CREATININE-BSD FRML MDRD: > 60 ML/MIN (ref 60–?)
GLUCOSE SERPL-MCNC: 103 MG/DL (ref 70–110)
HCT VFR BLD AUTO: 38 % (ref 36–46)
HGB BLD-MCNC: 12.1 G/DL (ref 12–16)
LYMPHOCYTES # BLD AUTO: 3.2 K/UL (ref 1–4.8)
LYMPHOCYTES NFR BLD AUTO: 49 % (ref 22–44)
MCH RBC QN AUTO: 26.7 PG (ref 26–34)
MCHC RBC AUTO-ENTMCNC: 31.9 G/DL (ref 31–37)
MCV RBC AUTO: 84 FL (ref 80–100)
MONOCYTES # BLD AUTO: 0.6 K/UL (ref 0.1–1)
MONOCYTES NFR BLD AUTO: 9.1 % (ref 2–9)
NEUTROPHILS # BLD AUTO: 2.5 K/UL (ref 1.8–7.7)
NEUTROPHILS NFR BLD AUTO: 38.2 % (ref 40–70)
PLATELET # BLD AUTO: 246 K/UL (ref 150–450)
POTASSIUM SERPL-SCNC: 3.9 MMOL/L (ref 3.5–5.1)
PROT SERPL-MCNC: 8 G/DL (ref 6.4–8.2)
RBC # BLD AUTO: 4.54 MIL/UL (ref 4–5.2)
SODIUM SERPL-SCNC: 142 MMOL/L (ref 136–145)

## 2019-04-02 PROCEDURE — 93005 ELECTROCARDIOGRAM TRACING: CPT

## 2023-05-10 ENCOUNTER — TELEPHONE (OUTPATIENT)
Dept: OBSTETRICS AND GYNECOLOGY | Facility: CLINIC | Age: 63
End: 2023-05-10
Payer: MEDICARE

## 2023-05-10 NOTE — TELEPHONE ENCOUNTER
----- Message from Manoj Magallon sent at 5/8/2023  3:34 PM CDT -----  Good afternoon,    The pt listed above is being referred from [facility] to Dr. Hernandez for (pelvis and perineal pain). I have scanned the referral and records into . I was not able to access the doctor's schedule. Please advise or contact pt to schedule appt at your earliest convenience.    Thank You,    [facility] Magallon  [facility]